# Patient Record
(demographics unavailable — no encounter records)

---

## 2025-07-10 NOTE — HISTORY OF PRESENT ILLNESS
[de-identified] : Quinn is a new patient seen today for the evaluation of acute left knee pain but also discomfort associated with the right knee.  He describes ongoing pain and discomfort associate with both knees.  This has been progressive over the last several years without a defined accident or injury.  He has not tried any extensive medications aside from over-the-counter Aleve and and at times has needed to take 3 of them in order to get relief.  He is employed as a  on a route walks well over 20,000 steps a day which is very difficult for him.  He tends to use compressive knee sleeve to help on the left knee.  Feels that his route has been increased in length and that it is inappropriately long.  This is not a Worker's Comp. issue.  He knows he has osteoarthritis and is trying to avoid knee replacement surgery as he is seeing other people go out as male carriers for knee replacement and not return to employment and is concerned about this.  He has about 4 years until he can retire.

## 2025-07-10 NOTE — ASSESSMENT
[FreeTextEntry1] : Impression bilateral left greater than right knee osteoarthritis with progression of symptoms clinically over the last several months.  He has not tried any prescription anti-inflammatories or injections nor is he had any physical therapy we discussed all these his options he is also not had viscosupplementation.  These were all discussed his options prior to any joint replacement surgery however I did take some time to go over his hip implants with him and explained what we do with joint replacement needing a surface replacement keeping the collateral ligaments and a good portion of the majority of what makes of a human knee remains in place.  We also talked about weight loss weight reduction scenarios and options and that it might ask to make a big difference for him to lose even a small amount of weight 10 to 20 pounds.  It I did place a referral for consultation with Magda Jensen for the evaluation of possible further weight loss assistance.  We discussed the potential for medication management of the first round being an anti-inflammatory in hopes to try and finding something might help him with Celebrex and/or meloxicam going through a trial.  Consideration for diclofenac if neither of these is helpful.  Second round of medical intervention with gabapentin or pregabalin is an option for him versus duloxetine.  Subsequent further intervention with corticosteroid injection plus minus aspiration based on patient's symptomatology and presentation and how he progresses with these conservative treatment options.

## 2025-07-10 NOTE — PHYSICAL EXAM
[de-identified] : Physical exam shows a tall statured large framed male very cooperative patient excellent historian in no acute distress.  Bilateral knee exams demonstrate a 1+1+ synovitis and effusion associate with the left knee with full extension lacking 3 degrees flexion is very good past 125 overall alignment is neutral there is no AP or varus valgus instability however there is some very slight pseudolaxity present medially with a solid endpoint to the MCL.  There is no evidence for meniscus pathology there is significant patellofemoral crepitus.  The right knee is similar with the exception as full extension and improved flexion past 130 again there is patellofemoral crepitus there is no varus valgus or AP instability and very slight MCL pseudolaxity.  Again and meniscus exam is negative.  Neurovascularly intact distally no evidence for hip pain or hip hip pain motion causing any recreation of knee pain.  He is technically morbidly obese with a BMI of 38. [de-identified] : 4 view radiographs of each knee demonstrate grade 4 changes in the medial compartment seen on the Farfan Farfan view both left and essentially the right knee as well.  Joint space narrowing is less apparent on the AP radiograph on the right knee however is advanced grade 3 medial compartment on the left knee.  There is end-stage grade 4 changes of bilateral patellofemoral articulations with slight lateralization likely due in part due to to significant wear of the lateral facets.  This does create a bone-on-bone articulation and subsequent lateralization of the patella.  There is no significant tilt.  Lateral compartment showed grade 3 changes as well.  There is peaking of the tibial eminences in both knees.  Subluxation is relatively minimal at this point.